# Patient Record
Sex: FEMALE | Race: WHITE | NOT HISPANIC OR LATINO | Employment: OTHER | URBAN - METROPOLITAN AREA
[De-identification: names, ages, dates, MRNs, and addresses within clinical notes are randomized per-mention and may not be internally consistent; named-entity substitution may affect disease eponyms.]

---

## 2017-02-02 ENCOUNTER — ALLSCRIPTS OFFICE VISIT (OUTPATIENT)
Dept: OTHER | Facility: OTHER | Age: 67
End: 2017-02-02

## 2017-02-02 DIAGNOSIS — Z12.31 ENCOUNTER FOR SCREENING MAMMOGRAM FOR MALIGNANT NEOPLASM OF BREAST: ICD-10-CM

## 2017-02-02 DIAGNOSIS — Z00.00 ENCOUNTER FOR GENERAL ADULT MEDICAL EXAMINATION WITHOUT ABNORMAL FINDINGS: ICD-10-CM

## 2017-02-02 DIAGNOSIS — G56.00 CARPAL TUNNEL SYNDROME: ICD-10-CM

## 2017-02-02 DIAGNOSIS — G56.22 LESION OF LEFT ULNAR NERVE: ICD-10-CM

## 2017-02-02 DIAGNOSIS — M81.0 AGE-RELATED OSTEOPOROSIS WITHOUT CURRENT PATHOLOGICAL FRACTURE: ICD-10-CM

## 2017-02-02 DIAGNOSIS — N95.2 POSTMENOPAUSAL ATROPHIC VAGINITIS: ICD-10-CM

## 2017-02-24 ENCOUNTER — APPOINTMENT (OUTPATIENT)
Dept: LAB | Facility: HOSPITAL | Age: 67
End: 2017-02-24
Payer: MEDICARE

## 2017-02-24 ENCOUNTER — HOSPITAL ENCOUNTER (OUTPATIENT)
Dept: RADIOLOGY | Facility: HOSPITAL | Age: 67
Discharge: HOME | End: 2017-02-24
Payer: MEDICARE

## 2017-02-24 ENCOUNTER — TRANSCRIBE ORDERS (OUTPATIENT)
Dept: LAB | Facility: HOSPITAL | Age: 67
End: 2017-02-24

## 2017-02-24 DIAGNOSIS — Z11.9 SCREENING EXAMINATION FOR UNSPECIFIED INFECTIOUS DISEASE: ICD-10-CM

## 2017-02-24 DIAGNOSIS — Z11.9 SCREENING EXAMINATION FOR UNSPECIFIED INFECTIOUS DISEASE: Primary | ICD-10-CM

## 2017-02-24 LAB — HCV AB SER QL: NORMAL

## 2017-02-24 PROCEDURE — 36415 COLL VENOUS BLD VENIPUNCTURE: CPT

## 2017-02-24 PROCEDURE — 86803 HEPATITIS C AB TEST: CPT

## 2017-02-24 PROCEDURE — 73140 X-RAY EXAM OF FINGER(S): CPT

## 2017-07-28 ENCOUNTER — TRANSCRIBE ORDERS (OUTPATIENT)
Dept: URGENT CARE | Age: 67
End: 2017-07-28

## 2017-07-28 ENCOUNTER — APPOINTMENT (OUTPATIENT)
Dept: RADIOLOGY | Age: 67
End: 2017-07-28
Attending: FAMILY MEDICINE
Payer: MEDICARE

## 2017-07-28 ENCOUNTER — OFFICE VISIT (OUTPATIENT)
Dept: URGENT CARE | Age: 67
End: 2017-07-28
Payer: MEDICARE

## 2017-07-28 DIAGNOSIS — M25.551 PAIN IN RIGHT HIP: ICD-10-CM

## 2017-07-28 DIAGNOSIS — M54.31 SCIATICA OF RIGHT SIDE: ICD-10-CM

## 2017-07-28 PROCEDURE — 99203 OFFICE O/P NEW LOW 30 MIN: CPT | Performed by: FAMILY MEDICINE

## 2017-07-28 PROCEDURE — 72110 X-RAY EXAM L-2 SPINE 4/>VWS: CPT

## 2017-07-28 PROCEDURE — G0463 HOSPITAL OUTPT CLINIC VISIT: HCPCS | Performed by: FAMILY MEDICINE

## 2017-07-28 PROCEDURE — 73502 X-RAY EXAM HIP UNI 2-3 VIEWS: CPT

## 2017-09-11 ENCOUNTER — TRANSCRIBE ORDERS (OUTPATIENT)
Dept: ADMINISTRATIVE | Facility: HOSPITAL | Age: 67
End: 2017-09-11

## 2017-09-11 DIAGNOSIS — R41.3 MEMORY LOSS: Primary | ICD-10-CM

## 2017-09-14 ENCOUNTER — TRANSCRIBE ORDERS (OUTPATIENT)
Dept: LAB | Facility: HOSPITAL | Age: 67
End: 2017-09-14

## 2017-09-14 ENCOUNTER — APPOINTMENT (OUTPATIENT)
Dept: LAB | Facility: HOSPITAL | Age: 67
End: 2017-09-14
Payer: MEDICARE

## 2017-09-14 DIAGNOSIS — Z79.83 LONG TERM (CURRENT) USE OF BISPHOSPHONATES: ICD-10-CM

## 2017-09-14 DIAGNOSIS — G31.84 MCI (MILD COGNITIVE IMPAIRMENT) WITH MEMORY LOSS: ICD-10-CM

## 2017-09-14 DIAGNOSIS — F41.1 ANXIETY STATE, UNSPECIFIED: ICD-10-CM

## 2017-09-14 DIAGNOSIS — E78.5 OTHER AND UNSPECIFIED HYPERLIPIDEMIA: ICD-10-CM

## 2017-09-14 DIAGNOSIS — F41.1 ANXIETY STATE, UNSPECIFIED: Primary | ICD-10-CM

## 2017-09-14 LAB
ALBUMIN SERPL BCP-MCNC: 4.2 G/DL (ref 3.5–5)
ALP SERPL-CCNC: 79 U/L (ref 46–116)
ALT SERPL W P-5'-P-CCNC: 25 U/L (ref 12–78)
ANION GAP SERPL CALCULATED.3IONS-SCNC: 6 MMOL/L (ref 4–13)
AST SERPL W P-5'-P-CCNC: 18 U/L (ref 5–45)
BILIRUB SERPL-MCNC: 0.4 MG/DL (ref 0.2–1)
BILIRUB UR QL STRIP: NEGATIVE
BUN SERPL-MCNC: 22 MG/DL (ref 5–25)
CALCIUM SERPL-MCNC: 9.4 MG/DL (ref 8.3–10.1)
CHLORIDE SERPL-SCNC: 107 MMOL/L (ref 100–108)
CHOLEST SERPL-MCNC: 205 MG/DL (ref 50–200)
CLARITY UR: CLEAR
CO2 SERPL-SCNC: 28 MMOL/L (ref 21–32)
COLOR UR: YELLOW
CREAT SERPL-MCNC: 0.94 MG/DL (ref 0.6–1.3)
ERYTHROCYTE [DISTWIDTH] IN BLOOD BY AUTOMATED COUNT: 14.6 % (ref 11.6–15.1)
GFR SERPL CREATININE-BSD FRML MDRD: 63 ML/MIN/1.73SQ M
GLUCOSE P FAST SERPL-MCNC: 87 MG/DL (ref 65–99)
GLUCOSE UR STRIP-MCNC: NEGATIVE MG/DL
HCT VFR BLD AUTO: 40.2 % (ref 34.8–46.1)
HDLC SERPL-MCNC: 82 MG/DL (ref 40–60)
HGB BLD-MCNC: 13.2 G/DL (ref 11.5–15.4)
HGB UR QL STRIP.AUTO: NEGATIVE
KETONES UR STRIP-MCNC: NEGATIVE MG/DL
LDLC SERPL CALC-MCNC: 94 MG/DL (ref 0–100)
LEUKOCYTE ESTERASE UR QL STRIP: NEGATIVE
MCH RBC QN AUTO: 28.8 PG (ref 26.8–34.3)
MCHC RBC AUTO-ENTMCNC: 32.8 G/DL (ref 31.4–37.4)
MCV RBC AUTO: 88 FL (ref 82–98)
NITRITE UR QL STRIP: NEGATIVE
PH UR STRIP.AUTO: 6.5 [PH] (ref 4.5–8)
PLATELET # BLD AUTO: 223 THOUSANDS/UL (ref 149–390)
PMV BLD AUTO: 10.4 FL (ref 8.9–12.7)
POTASSIUM SERPL-SCNC: 3.8 MMOL/L (ref 3.5–5.3)
PROT SERPL-MCNC: 7.1 G/DL (ref 6.4–8.2)
PROT UR STRIP-MCNC: NEGATIVE MG/DL
RBC # BLD AUTO: 4.58 MILLION/UL (ref 3.81–5.12)
SODIUM SERPL-SCNC: 141 MMOL/L (ref 136–145)
SP GR UR STRIP.AUTO: 1.02 (ref 1–1.03)
TRIGL SERPL-MCNC: 144 MG/DL
TSH SERPL DL<=0.05 MIU/L-ACNC: 1.22 UIU/ML (ref 0.36–3.74)
UROBILINOGEN UR QL STRIP.AUTO: 0.2 E.U./DL
VIT B12 SERPL-MCNC: 1005 PG/ML (ref 100–900)
WBC # BLD AUTO: 6.28 THOUSAND/UL (ref 4.31–10.16)

## 2017-09-14 PROCEDURE — 82607 VITAMIN B-12: CPT

## 2017-09-14 PROCEDURE — 36415 COLL VENOUS BLD VENIPUNCTURE: CPT

## 2017-09-14 PROCEDURE — 80061 LIPID PANEL: CPT

## 2017-09-14 PROCEDURE — 81003 URINALYSIS AUTO W/O SCOPE: CPT | Performed by: INTERNAL MEDICINE

## 2017-09-14 PROCEDURE — 84443 ASSAY THYROID STIM HORMONE: CPT

## 2017-09-14 PROCEDURE — 85027 COMPLETE CBC AUTOMATED: CPT

## 2017-09-14 PROCEDURE — 80053 COMPREHEN METABOLIC PANEL: CPT

## 2017-09-18 ENCOUNTER — HOSPITAL ENCOUNTER (OUTPATIENT)
Dept: RADIOLOGY | Facility: HOSPITAL | Age: 67
Discharge: HOME | End: 2017-09-18
Payer: MEDICARE

## 2017-09-18 DIAGNOSIS — R41.3 MEMORY LOSS: ICD-10-CM

## 2017-09-18 PROCEDURE — A9585 GADOBUTROL INJECTION: HCPCS | Performed by: RADIOLOGY

## 2017-09-18 PROCEDURE — 70553 MRI BRAIN STEM W/O & W/DYE: CPT

## 2017-09-18 RX ADMIN — GADOBUTROL 4 ML: 604.72 INJECTION INTRAVENOUS at 18:27

## 2018-01-12 NOTE — MISCELLANEOUS
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)   2  Carpal tunnel syndrome, unspecified laterality (354 0) (G56 00)   3  Cubital tunnel syndrome on left (354 2) (G56 22)   4  Dense breasts (793 82) (R92 2)   5  Encounter for routine gynecological examination (V72 31) (Z01 419)   6  Encounter for screening mammogram for malignant neoplasm of breast (V76 12)   (Z12 31)   7  History of self breast exam   8  Postmenopausal osteoporosis (733 01) (M81 0)    Current Meds   1  Alendronate Sodium 70 MG Oral Tablet; TAKE 1 TABLET ONCE WEEKLY; Therapy: 41ICM4800 to (Evaluate:17Nov2016)  Requested for: 96Pnx5200; Last   Rx:91Hrf1826 Ordered   2  ALPRAZolam 0 25 MG Oral Tablet; Therapy: 84YXO3074 to (Last Rx:24Oct2011)  Requested for: 24Oct2011 Ordered   3  Aspirin EC Lo-Dose 81 MG TBEC Recorded   4  Caltrate 600 TABS Recorded   5  [facility] Fish Oil CAPS Recorded   6  Escitalopram Oxalate 10 MG Oral Tablet; Therapy: (Dawn Cam) to Recorded   7  Lipitor 10 MG Oral Tablet (Atorvastatin Calcium); Therapy: 63KLW4781 to (Last Rx:10Aug2011)  Requested for: 10Aug2011 Ordered   8  Venlafaxine HCl ER 37 5 MG Oral Capsule Extended Release 24 Hour; Therapy: 22QWJ3428 to (Last Rx:95Fjt0643)  Requested for: 64Lco6824 Ordered    Allergies    1   No Known Drug Allergies    Signatures   Electronically signed by : Doreen July, ; Feb 4 2016  8:19AM EST                       (Author)

## 2018-01-13 VITALS
WEIGHT: 109 LBS | BODY MASS INDEX: 21.4 KG/M2 | DIASTOLIC BLOOD PRESSURE: 78 MMHG | SYSTOLIC BLOOD PRESSURE: 130 MMHG | HEIGHT: 60 IN

## 2018-01-16 NOTE — MISCELLANEOUS
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)   2  Carpal tunnel syndrome, unspecified laterality (354 0) (G56 00)   3  Cubital tunnel syndrome on left (354 2) (G56 22)   4  Dense breasts (793 82) (R92 2)   5  Encounter for routine gynecological examination (V72 31) (Z01 419)   6  Encounter for screening mammogram for malignant neoplasm of breast (V76 12)   (Z12 31)   7  History of self breast exam   8  Postmenopausal osteoporosis (733 01) (M81 0)    Current Meds   1  Alendronate Sodium 70 MG Oral Tablet; TAKE 1 TABLET ONCE WEEKLY; Therapy: 86XOP6854 to (Jed Lundborg)  Requested for: 51Lni7302; Last   Rx:06Ccv6336 Ordered   2  ALPRAZolam 0 25 MG Oral Tablet; Therapy: 88EPF7847 to (Last Rx:24Oct2011)  Requested for: 24Oct2011 Ordered   3  Aspirin EC Lo-Dose 81 MG TBEC Recorded   4  Caltrate 600 TABS Recorded   5  [facility] Fish Oil CAPS Recorded   6  Escitalopram Oxalate 10 MG Oral Tablet; Therapy: (Mylinda Goodpasture) to Recorded   7  Lipitor 10 MG Oral Tablet (Atorvastatin Calcium); Therapy: 36SFE8623 to (Last Rx:10Aug2011)  Requested for: 10Aug2011 Ordered   8  Venlafaxine HCl ER 37 5 MG Oral Capsule Extended Release 24 Hour; Therapy: 77CMS0322 to (Last Rx:43Wte1817)  Requested for: 80Afa7349 Ordered    Allergies    1   No Known Drug Allergies    Signatures   Electronically signed by : Kalina Gutierres, ; Nov 1 2016 11:36AM EST                       (Author)

## 2018-01-16 NOTE — MISCELLANEOUS
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)   2  Carpal tunnel syndrome, unspecified laterality (354 0) (G56 00)   3  Cubital tunnel syndrome on left (354 2) (G56 22)   4  Dense breasts (793 82) (R92 2)   5  Encounter for routine gynecological examination (V72 31) (Z01 419)   6  Encounter for screening mammogram for malignant neoplasm of breast (V76 12)   (Z12 31)   7  History of self breast exam   8  Postmenopausal osteoporosis (733 01) (M81 0)    Current Meds   1  Alendronate Sodium 70 MG Oral Tablet; TAKE 1 TABLET ONCE WEEKLY; Therapy: 19TXF1740 to (Evaluate:17Nov2016)  Requested for: 40CQK3906; Last   Rx:12Hjz8356; Status: ACTIVE - Renewal Denied Ordered   2  ALPRAZolam 0 25 MG Oral Tablet; Therapy: 17KPS1074 to (Last Rx:24Oct2011)  Requested for: 24Oct2011 Ordered   3  Aspirin EC Lo-Dose 81 MG TBEC Recorded   4  Caltrate 600 TABS Recorded   5  [facility] Fish Oil CAPS Recorded   6  Escitalopram Oxalate 10 MG Oral Tablet; Therapy: (Anthony Calero) to Recorded   7  Lipitor 10 MG Oral Tablet (Atorvastatin Calcium); Therapy: 10CEC2837 to (Last Rx:10Aug2011)  Requested for: 10Aug2011 Ordered   8  Venlafaxine HCl ER 37 5 MG Oral Capsule Extended Release 24 Hour; Therapy: 40LKT7455 to (Last Rx:53Jhi4470)  Requested for: 42Iuc1142 Ordered    Allergies    1   No Known Drug Allergies    Plan  Postmenopausal osteoporosis    · Alendronate Sodium 70 MG Oral Tablet; TAKE 1 TABLET ONCE WEEKLY    Signatures   Electronically signed by : Dary Clark, ; Aug 16 2016 12:12PM EST                       (Author)

## 2018-09-17 ENCOUNTER — APPOINTMENT (RX ONLY)
Dept: URBAN - METROPOLITAN AREA OTHER 10 | Facility: OTHER | Age: 68
Setting detail: DERMATOLOGY
End: 2018-09-17

## 2018-09-17 DIAGNOSIS — L98.1 FACTITIAL DERMATITIS: ICD-10-CM

## 2018-09-17 PROBLEM — E78.5 HYPERLIPIDEMIA, UNSPECIFIED: Status: ACTIVE | Noted: 2018-09-17

## 2018-09-17 PROBLEM — I10 ESSENTIAL (PRIMARY) HYPERTENSION: Status: ACTIVE | Noted: 2018-09-17

## 2018-09-17 PROCEDURE — ? COUNSELING

## 2018-09-17 PROCEDURE — ? ORDER TESTS

## 2018-09-17 PROCEDURE — 99202 OFFICE O/P NEW SF 15 MIN: CPT

## 2018-09-17 NOTE — HPI: RASH
How Severe Is Your Rash?: moderate
Is This A New Presentation, Or A Follow-Up?: Rash
Additional History: No rash seen today, only excoriations

## 2018-10-22 ENCOUNTER — APPOINTMENT (RX ONLY)
Dept: URBAN - METROPOLITAN AREA OTHER 10 | Facility: OTHER | Age: 68
Setting detail: DERMATOLOGY
End: 2018-10-22

## 2018-10-22 DIAGNOSIS — D22 MELANOCYTIC NEVI: ICD-10-CM

## 2018-10-22 DIAGNOSIS — L82.1 OTHER SEBORRHEIC KERATOSIS: ICD-10-CM

## 2018-10-22 DIAGNOSIS — L98.1 FACTITIAL DERMATITIS: ICD-10-CM | Status: RESOLVED

## 2018-10-22 DIAGNOSIS — L81.4 OTHER MELANIN HYPERPIGMENTATION: ICD-10-CM

## 2018-10-22 PROBLEM — D22.5 MELANOCYTIC NEVI OF TRUNK: Status: ACTIVE | Noted: 2018-10-22

## 2018-10-22 PROCEDURE — ? COUNSELING

## 2018-10-22 PROCEDURE — 99214 OFFICE O/P EST MOD 30 MIN: CPT

## 2018-10-22 ASSESSMENT — LOCATION DETAILED DESCRIPTION DERM
LOCATION DETAILED: LEFT MEDIAL SUPERIOR CHEST
LOCATION DETAILED: RIGHT PROXIMAL MEDIAL POSTERIOR UPPER ARM
LOCATION DETAILED: SUPERIOR THORACIC SPINE

## 2018-10-22 ASSESSMENT — LOCATION SIMPLE DESCRIPTION DERM
LOCATION SIMPLE: RIGHT POSTERIOR UPPER ARM
LOCATION SIMPLE: CHEST
LOCATION SIMPLE: UPPER BACK

## 2018-10-22 ASSESSMENT — LOCATION ZONE DERM
LOCATION ZONE: ARM
LOCATION ZONE: TRUNK

## 2019-10-07 ENCOUNTER — APPOINTMENT (RX ONLY)
Dept: URBAN - METROPOLITAN AREA OTHER 10 | Facility: OTHER | Age: 69
Setting detail: DERMATOLOGY
End: 2019-10-07

## 2019-10-07 DIAGNOSIS — L98.1 FACTITIAL DERMATITIS: ICD-10-CM | Status: WELL CONTROLLED

## 2019-10-07 DIAGNOSIS — L64.8 OTHER ANDROGENIC ALOPECIA: ICD-10-CM

## 2019-10-07 DIAGNOSIS — T07XXXA INSECT BITE, NONVENOMOUS, OF OTHER, MULTIPLE, AND UNSPECIFIED SITES, WITHOUT MENTION OF INFECTION: ICD-10-CM

## 2019-10-07 PROBLEM — S80.861A INSECT BITE (NONVENOMOUS), RIGHT LOWER LEG, INITIAL ENCOUNTER: Status: ACTIVE | Noted: 2019-10-07

## 2019-10-07 PROBLEM — S80.862A INSECT BITE (NONVENOMOUS), LEFT LOWER LEG, INITIAL ENCOUNTER: Status: ACTIVE | Noted: 2019-10-07

## 2019-10-07 PROCEDURE — 99213 OFFICE O/P EST LOW 20 MIN: CPT

## 2019-10-07 PROCEDURE — ? COUNSELING

## 2019-10-07 PROCEDURE — ? PRESCRIPTION

## 2019-10-07 PROCEDURE — ? TREATMENT REGIMEN

## 2019-10-07 RX ORDER — TRIAMCINOLONE ACETONIDE 1 MG/G
CREAM TOPICAL BID
Qty: 80 | Refills: 0 | Status: ERX

## 2019-10-07 ASSESSMENT — LOCATION ZONE DERM
LOCATION ZONE: NECK
LOCATION ZONE: LEG
LOCATION ZONE: SCALP

## 2019-10-07 ASSESSMENT — LOCATION DETAILED DESCRIPTION DERM
LOCATION DETAILED: MID POSTERIOR NECK
LOCATION DETAILED: MID-OCCIPITAL SCALP
LOCATION DETAILED: MID-FRONTAL SCALP
LOCATION DETAILED: LEFT DISTAL PRETIBIAL REGION
LOCATION DETAILED: RIGHT DISTAL PRETIBIAL REGION

## 2019-10-07 ASSESSMENT — LOCATION SIMPLE DESCRIPTION DERM
LOCATION SIMPLE: RIGHT PRETIBIAL REGION
LOCATION SIMPLE: ANTERIOR SCALP
LOCATION SIMPLE: POSTERIOR SCALP
LOCATION SIMPLE: POSTERIOR NECK
LOCATION SIMPLE: LEFT PRETIBIAL REGION

## 2019-10-07 NOTE — PROCEDURE: TREATMENT REGIMEN
Initiate Treatment: Triamcinolone cream - Apply to affected area on legs  twice daily x 2 weeks then as needed
Detail Level: Simple
Otc Regimen: Rogaine bid on scalp